# Patient Record
Sex: MALE | Race: BLACK OR AFRICAN AMERICAN | NOT HISPANIC OR LATINO | Employment: FULL TIME | URBAN - METROPOLITAN AREA
[De-identification: names, ages, dates, MRNs, and addresses within clinical notes are randomized per-mention and may not be internally consistent; named-entity substitution may affect disease eponyms.]

---

## 2020-04-08 ENCOUNTER — NON-PROVIDER VISIT (OUTPATIENT)
Dept: URGENT CARE | Facility: PHYSICIAN GROUP | Age: 30
End: 2020-04-08

## 2020-04-08 DIAGNOSIS — Z02.1 PRE-EMPLOYMENT DRUG SCREENING: ICD-10-CM

## 2020-04-08 LAB
AMP AMPHETAMINE: NORMAL
COC COCAINE: NORMAL
INT CON NEG: NEGATIVE
INT CON POS: POSITIVE
MET METHAMPHETAMINES: NORMAL
OPI OPIATES: NORMAL
PCP PHENCYCLIDINE: NORMAL
POC DRUG COMMENT 753798-OCCUPATIONAL HEALTH: NEGATIVE
THC: NORMAL

## 2020-04-08 PROCEDURE — 80305 DRUG TEST PRSMV DIR OPT OBS: CPT | Performed by: PHYSICIAN ASSISTANT

## 2020-12-02 ENCOUNTER — HOSPITAL ENCOUNTER (OUTPATIENT)
Dept: RADIOLOGY | Facility: MEDICAL CENTER | Age: 30
End: 2020-12-02
Attending: FAMILY MEDICINE
Payer: COMMERCIAL

## 2020-12-02 ENCOUNTER — OFFICE VISIT (OUTPATIENT)
Dept: URGENT CARE | Facility: PHYSICIAN GROUP | Age: 30
End: 2020-12-02
Payer: COMMERCIAL

## 2020-12-02 ENCOUNTER — HOSPITAL ENCOUNTER (OUTPATIENT)
Facility: MEDICAL CENTER | Age: 30
End: 2020-12-02
Attending: FAMILY MEDICINE
Payer: COMMERCIAL

## 2020-12-02 VITALS
TEMPERATURE: 99.7 F | BODY MASS INDEX: 33.07 KG/M2 | RESPIRATION RATE: 20 BRPM | OXYGEN SATURATION: 87 % | HEIGHT: 75 IN | WEIGHT: 266 LBS | HEART RATE: 131 BPM | DIASTOLIC BLOOD PRESSURE: 62 MMHG | SYSTOLIC BLOOD PRESSURE: 118 MMHG

## 2020-12-02 DIAGNOSIS — J98.8 RTI (RESPIRATORY TRACT INFECTION): ICD-10-CM

## 2020-12-02 PROCEDURE — U0003 INFECTIOUS AGENT DETECTION BY NUCLEIC ACID (DNA OR RNA); SEVERE ACUTE RESPIRATORY SYNDROME CORONAVIRUS 2 (SARS-COV-2) (CORONAVIRUS DISEASE [COVID-19]), AMPLIFIED PROBE TECHNIQUE, MAKING USE OF HIGH THROUGHPUT TECHNOLOGIES AS DESCRIBED BY CMS-2020-01-R: HCPCS

## 2020-12-02 PROCEDURE — 71046 X-RAY EXAM CHEST 2 VIEWS: CPT

## 2020-12-02 PROCEDURE — 99214 OFFICE O/P EST MOD 30 MIN: CPT | Performed by: FAMILY MEDICINE

## 2020-12-02 RX ORDER — DEXAMETHASONE 6 MG/1
6 TABLET ORAL DAILY
Qty: 4 TAB | Refills: 0 | Status: SHIPPED | OUTPATIENT
Start: 2020-12-03 | End: 2020-12-07

## 2020-12-02 RX ORDER — BENZONATATE 100 MG/1
200 CAPSULE ORAL 3 TIMES DAILY PRN
Qty: 30 CAP | Refills: 1 | Status: SHIPPED | OUTPATIENT
Start: 2020-12-02 | End: 2021-02-09

## 2020-12-02 RX ORDER — ASPIRIN 81 MG/1
162 TABLET, CHEWABLE ORAL ONCE
Status: COMPLETED | OUTPATIENT
Start: 2020-12-02 | End: 2020-12-02

## 2020-12-02 RX ORDER — AZITHROMYCIN 250 MG/1
TABLET, FILM COATED ORAL
Qty: 6 TAB | Refills: 0 | Status: SHIPPED | OUTPATIENT
Start: 2020-12-02 | End: 2021-02-09

## 2020-12-02 RX ORDER — DEXAMETHASONE SODIUM PHOSPHATE 4 MG/ML
8 INJECTION, SOLUTION INTRA-ARTICULAR; INTRALESIONAL; INTRAMUSCULAR; INTRAVENOUS; SOFT TISSUE ONCE
Status: COMPLETED | OUTPATIENT
Start: 2020-12-02 | End: 2020-12-02

## 2020-12-02 RX ADMIN — DEXAMETHASONE SODIUM PHOSPHATE 8 MG: 4 INJECTION, SOLUTION INTRA-ARTICULAR; INTRALESIONAL; INTRAMUSCULAR; INTRAVENOUS; SOFT TISSUE at 17:03

## 2020-12-02 RX ADMIN — ASPIRIN 162 MG: 81 TABLET, CHEWABLE ORAL at 17:02

## 2020-12-02 ASSESSMENT — ENCOUNTER SYMPTOMS
SHORTNESS OF BREATH: 1
COUGH: 1
FEVER: 1

## 2020-12-02 NOTE — PROGRESS NOTES
"Subjective:      Micah Jimenez is a 30 y.o. male who presents with Cough (SOB,(x3 days) cough (14x days) )    - This is a pleasant and nontoxic appearing 30 y.o. male with c/o ~1.5wks ago developed flu like symptoms (cough/sinus/headaches/subjective fevers/aches/malaise). This has mostly all improved except still has cough and now has been feeling more winded when he does activity over past 3 days. Says is not feeling more SOB, has been stable SOB over past 3 days and says can walk around house w/o getting too winded. No chest pain or limb swelling.             ALLERGIES:  Patient has no allergy information on record.     PMH:  History reviewed. No pertinent past medical history.     PSH:  History reviewed. No pertinent surgical history.    MEDS:    Current Outpatient Medications:   •  azithromycin (ZITHROMAX) 250 MG Tab, Use as directed, Disp: 6 Tab, Rfl: 0  •  [START ON 12/3/2020] dexamethasone (DECADRON) 6 MG Tab, Take 1 Tab by mouth every day for 4 days., Disp: 4 Tab, Rfl: 0  •  benzonatate (TESSALON) 100 MG Cap, Take 2 Caps by mouth 3 times a day as needed for Cough., Disp: 30 Cap, Rfl: 1    Current Facility-Administered Medications:   •  dexamethasone (DECADRON) injection 8 mg, 8 mg, Intramuscular, Once, Michael Grant M.D.  •  aspirin (ASA) chewable tab 162 mg, 162 mg, Oral, Once, Michael Grant M.D.    ** I have documented what I find to be significant in regards to past medical, social, family and surgical history  in my HPI or under PMH/PSH/ review section, otherwise it is noncontributory **             HPI    Review of Systems   Constitutional: Positive for fever.   Respiratory: Positive for cough and shortness of breath.    All other systems reviewed and are negative.         Objective:     /62 (BP Location: Left arm, Patient Position: Sitting, BP Cuff Size: Adult)   Pulse (!) 131   Temp 37.6 °C (99.7 °F) (Temporal)   Resp 20   Ht 1.905 m (6' 3\") Comment: per pt  Wt 120.7 kg (266 " lb) Comment: per pt  SpO2 (!) 87%   BMI 33.25 kg/m²      Physical Exam  Vitals signs and nursing note reviewed.   Constitutional:       General: He is not in acute distress.     Appearance: He is well-developed. He is not diaphoretic.   HENT:      Head: Normocephalic and atraumatic.      Mouth/Throat:      Mouth: Mucous membranes are moist.      Pharynx: Oropharynx is clear.   Eyes:      General: No scleral icterus.     Conjunctiva/sclera: Conjunctivae normal.   Cardiovascular:      Heart sounds: Normal heart sounds. No murmur.   Pulmonary:      Effort: Pulmonary effort is normal. No respiratory distress.      Breath sounds: Normal breath sounds.   Skin:     Coloration: Skin is not pale.      Findings: No rash.   Neurological:      Mental Status: He is alert.      Motor: No abnormal muscle tone.   Psychiatric:         Mood and Affect: Mood normal.         Behavior: Behavior normal.         Judgment: Judgment normal.                 Assessment/Plan:            1. RTI (respiratory tract infection)  DX-CHEST-2 VIEWS    COVID/SARS COV-2 PCR    azithromycin (ZITHROMAX) 250 MG Tab    dexamethasone (DECADRON) injection 8 mg    dexamethasone (DECADRON) 6 MG Tab    REFERRAL TO FOLLOW-UP WITH PRIMARY CARE    aspirin (ASA) chewable tab 162 mg    benzonatate (TESSALON) 100 MG Cap     * discussed w/ patient going to ER today for further eval as he does drop below 90% w/ activity and HR goes up to 130. Resting HR and pulse Ox 90 and 92%.     He says he will not go to ER, does not want to go and rather just go home now.     - Dx & d/c instructions discussed w/ patient and/or family members.   - rest/hydrate  - 2 baby asa daily  - close observation  - prone for 1 hr 3-4x/day, blow balloon up 3-4x/hr while awake  - E.R. precautions discussed       Follow up with their PCP in 1-2 days strongly advised, ER if not improving or feeling/getting worse.

## 2020-12-02 NOTE — LETTER
December 2, 2020         Patient: Micah Jimenez   YOB: 1990   Date of Visit: 12/2/2020           To Whom it May Concern:    Micah Jimenez was seen in my clinic on 12/2/2020. He is to be off work for 1 week, returning 12/10/2020.    If you have any questions or concerns, please don't hesitate to call.        Sincerely,           Michael Grant M.D.  Electronically Signed

## 2020-12-03 DIAGNOSIS — J98.8 RTI (RESPIRATORY TRACT INFECTION): ICD-10-CM

## 2020-12-03 LAB — COVID ORDER STATUS COVID19: NORMAL

## 2020-12-04 ENCOUNTER — TELEPHONE (OUTPATIENT)
Dept: URGENT CARE | Facility: PHYSICIAN GROUP | Age: 30
End: 2020-12-04

## 2020-12-04 ENCOUNTER — TELEPHONE (OUTPATIENT)
Dept: SCHEDULING | Facility: IMAGING CENTER | Age: 30
End: 2020-12-04

## 2020-12-04 LAB
SARS-COV-2 RNA RESP QL NAA+PROBE: NOTDETECTED
SPECIMEN SOURCE: NORMAL

## 2020-12-04 NOTE — TELEPHONE ENCOUNTER
Kindly call (DOCUMENT CALL OR ATTEMPTED CALL IN EPIC) and let patient know:     His coronavirus test did not show coronavirus but this is probably a false negative b/c his chest x-ray had changes of coronavirus pneumonia.     If he is not feeling improved for the other day he was here or is feeling worse or more SOB he should go to the ER

## 2021-02-09 ENCOUNTER — OFFICE VISIT (OUTPATIENT)
Dept: MEDICAL GROUP | Facility: PHYSICIAN GROUP | Age: 31
End: 2021-02-09
Payer: COMMERCIAL

## 2021-02-09 VITALS
DIASTOLIC BLOOD PRESSURE: 78 MMHG | WEIGHT: 282.2 LBS | SYSTOLIC BLOOD PRESSURE: 126 MMHG | BODY MASS INDEX: 35.09 KG/M2 | TEMPERATURE: 98.2 F | OXYGEN SATURATION: 96 % | HEART RATE: 90 BPM | HEIGHT: 75 IN

## 2021-02-09 DIAGNOSIS — Z13.0 SCREENING FOR ENDOCRINE, METABOLIC AND IMMUNITY DISORDER: ICD-10-CM

## 2021-02-09 DIAGNOSIS — Z13.29 SCREENING FOR ENDOCRINE, METABOLIC AND IMMUNITY DISORDER: ICD-10-CM

## 2021-02-09 DIAGNOSIS — R06.83 SNORING: ICD-10-CM

## 2021-02-09 DIAGNOSIS — Z13.228 SCREENING FOR ENDOCRINE, METABOLIC AND IMMUNITY DISORDER: ICD-10-CM

## 2021-02-09 PROCEDURE — 99214 OFFICE O/P EST MOD 30 MIN: CPT | Performed by: PHYSICIAN ASSISTANT

## 2021-02-09 SDOH — HEALTH STABILITY: MENTAL HEALTH: HOW OFTEN DO YOU HAVE A DRINK CONTAINING ALCOHOL?: MONTHLY OR LESS

## 2021-02-09 ASSESSMENT — PATIENT HEALTH QUESTIONNAIRE - PHQ9: CLINICAL INTERPRETATION OF PHQ2 SCORE: 0

## 2021-02-09 NOTE — PATIENT INSTRUCTIONS
SLEEP STUDY INSTRUCTIONS    1. Our main concern is to provide the best test and evaluation of your sleep and your cooperation in following the guidelines is very necessary.    2. We have no facilities for family members or guests at the sleep center. Special arrangements will be made for children requiring overnight sleep studies.    3. Unless otherwise instructed, AVOID alcoholic beverages on the day of your sleep study.    4. DO NOT drink coffee or caffeine-containing beverages after 12:00 noon on the day of your sleep study.    5. There is NO smoking at the sleep center.    6. Try to maintain a usual daytime schedule prior to the study (avoid unusual physical activity or meals).    7. DO NOT take a nap on the day of your study.    8. This is an outpatient procedure (test); therefore, nursing services, medications, and meals ARE NOT provided. If you take medications, bring them with you and take them on the schedule you do at home.    9. Please fill your sleep aid prescription (Ambien or Lunesta) and bring to your sleep study. Even patients who normally have no problem going to sleep often need a sleep aid in this different environment.    10. We ask that you wear conventional sleep attire (pajamas or sweats) for the sleep study. We discourage patients from wearing only their underwear to bed. We recommend two-piece pajamas as the techs will need to apply sensors to your stomach.    11. Please shampoo your hair the day of the sleep study. Please DO NOT use any other hair or skin products before your arrival (e.g., mousse, gel, hair or body spray, perfume, body lotion etc.) NOTE: Women should not wear heavy makeup prior to arrival as some wires are taped to the face area.    12. The technician will be applying several small electrodes to the scalp, eye area, chin, chest, and legs, plus respiratory effort belts around the chest. Also, there will be a device placed directly under the nose. (THIS WILL NOT OBSTRUCT  YOUR BREATHING.) This is a painless procedure and the skin is not broken.    13. The test is generally completed in six to eight hours; We are usually done between 6 - 7 a.m., unless you are scheduled for a nap study. You may need to come back another night for a second study to complete your treatment plan.    14. Patients who are scheduled for an MSLT (nap study) will stay at the sleep center for the day following their nighttime study. You will be notified if a nap study was ordered for you at the time the night study is scheduled. Generally, patients having a nap study will leave the sleep center by 4 p.m.    15. You will need to bring food for the following day if you are scheduled for a nap study. A refrigerator and microwave are available.    16. A bathroom is available for your use.    17. We are able to adjust the room temperature for your comfort. Please let the technologist know if you are uncomfortable during the study.    18. If you sleep better with a special pillow or stuffed animal, you may bring it along. Service animals are the only live animals permitted.    19. Cable T.V. is available.    20. You will be scheduled for a follow-up appointment three to five days after the sleep study to review your results.    21. A copy of your sleep study is sent to the referring physician approximately two weeks after your study.    22. Any questions can be directed to our staff at [phone].    23. If CPAP therapy is applied, a home unit will be ordered for you through the [employer] medical equipment company. You will be contacted to schedule delivery after insurance authorization.

## 2021-02-09 NOTE — PROGRESS NOTES
"Subjective:     CC: Establish care, obesity, snoring    HPI:   Micah presents today with     BMI 35.0-35.9,adult  Feels like his weight is up, eating fast food 4 times per week.  Patient reports that him and his fiancée will have a very busy schedule and that it can be difficult making dinner.  He is also not going to the gym as often due to COVID-19 and having capacity limits at the gym.     Snoring  Patient reports that he snores during the night and also feels very tired during the day sometimes.  His fiancée has never told him that he stops breathing when he sleeps      History reviewed. No pertinent past medical history.    Social History     Tobacco Use   • Smoking status: Never Smoker   • Smokeless tobacco: Never Used   Substance Use Topics   • Alcohol use: Yes     Frequency: Monthly or less     Comment: once every 3-4 months   • Drug use: Not on file       No current [facility]-ordered outpatient medications on file.     No current [facility]-ordered facility-administered medications on file.        Allergies:  Patient has no allergy information on record.    Health Maintenance: Completed.  Patient is going to schedule an MA visit for his tetanus shot after talking to his insurance to make sure that it is covered    ROS:  Gen: no fevers/chills  Eyes: no changes in vision  ENT: no sore throat  Pulm: no sob, no cough  CV: no chest pain  GI: no nausea/vomiting  : no dysuria  MSk: no myalgias  Skin: no rash  Neuro: no headaches  Psych: no depression, no anxiety      Objective:       Exam:  /78   Pulse 90   Temp 36.8 °C (98.2 °F) (Temporal)   Ht 1.905 m (6' 3\")   Wt (!) 128 kg (282 lb 3.2 oz)   SpO2 96%   BMI 35.27 kg/m²  Body mass index is 35.27 kg/m².    Gen: Alert and oriented, No apparent distress.  Skin: Warm, dry, good turgor, no rashes in visible areas.  Eye: Equal, round and reactive, conjunctiva clear, lids normal.  Moist mucous membranes, tonsils are 2+.  Normal posterior pharynx  Neck: Trachea " midline, no masses, no thyromegaly  Lungs: Normal effort, CTA bilaterally, no wheezes, rhonchi, or rales  CV: Regular rate and rhythm. No murmurs, rubs, or gallops.  MSK: Normal gait, moves all extremities.  Neuro: Grossly non-focal.  Ext: No clubbing, cyanosis, edema.  Psych: Alert and oriented x3, normal affect and mood.     Labs: Labs from 12/3/2020 were reviewed.     Assessment & Plan:     31 y.o. male with the following -     1. BMI 35.0-35.9,adult  Patient was counseled extensively about healthy diet and exercise.  He was encouraged to cut back on the amount of fast food he is eating, and if he is going to eat out to make healthier choices which she has done in the past and been successful with.  He is also going to start drinking protein shakes for breakfast which he has previously been successful with weight loss.  We talked at a referral to the health improvement program, however the patient would like to work on his diet and exercise at home prior to having referral.     2. Snoring  Patient has a STOP-BANG score of 5.  I placed a referral for a sleep study to rule out KEDAR.   - Polysomnography, 4 or More; Future  - REFERRAL TO PULMONARY AND SLEEP MEDICINE    3. Screening for endocrine, metabolic and immunity disorder  Annual labs ordered today.   - Lipid Profile; Future  - CBC WITH DIFFERENTIAL; Future  - Comp Metabolic Panel; Future  - HEMOGLOBIN A1C; Future    I spent a total of 36 minutes with record review (including external notes and labs), exam, communication with the patient, communication with other providers, and documentation of this encounter.     Return in about 1 year (around 2/9/2022), or if symptoms worsen or fail to improve, for annual wellness.    Please note that this dictation was created using voice recognition software. I have made every reasonable attempt to correct obvious errors, but I expect that there are errors of grammar and possibly content that I did not discover before  finalizing the note.

## 2021-02-09 NOTE — ASSESSMENT & PLAN NOTE
Patient reports that he snores during the night and also feels very tired during the day sometimes.  His fiancée has never told him that he stops breathing when he sleeps

## 2021-02-09 NOTE — ASSESSMENT & PLAN NOTE
Feels like his weight is up, eating fast food 4 times per week.  Patient reports that him and his fiancée will have a very busy schedule and that it can be difficult making dinner.  He is also not going to the gym as often due to COVID-19 and having capacity limits at the gym.

## 2021-02-11 ENCOUNTER — TELEPHONE (OUTPATIENT)
Dept: SLEEP MEDICINE | Facility: MEDICAL CENTER | Age: 31
End: 2021-02-11

## 2021-02-11 DIAGNOSIS — G47.33 OSA (OBSTRUCTIVE SLEEP APNEA): ICD-10-CM

## 2021-02-19 ENCOUNTER — TELEPHONE (OUTPATIENT)
Dept: MEDICAL GROUP | Facility: PHYSICIAN GROUP | Age: 31
End: 2021-02-19

## 2021-02-19 DIAGNOSIS — Z23 NEED FOR VACCINATION: ICD-10-CM

## 2021-02-19 NOTE — TELEPHONE ENCOUNTER
Patient is on the MA Schedule 02/22/2021 for tdap vaccine/injection.    SPECIFIC Action To Be Taken: Orders pending, please sign.

## 2021-11-01 ENCOUNTER — OFFICE VISIT (OUTPATIENT)
Dept: MEDICAL GROUP | Facility: PHYSICIAN GROUP | Age: 31
End: 2021-11-01

## 2021-11-01 VITALS
HEIGHT: 75 IN | RESPIRATION RATE: 16 BRPM | BODY MASS INDEX: 37.55 KG/M2 | OXYGEN SATURATION: 98 % | TEMPERATURE: 98.2 F | DIASTOLIC BLOOD PRESSURE: 82 MMHG | WEIGHT: 302 LBS | HEART RATE: 96 BPM | SYSTOLIC BLOOD PRESSURE: 130 MMHG

## 2021-11-01 DIAGNOSIS — Z02.89 ENCOUNTER FOR COMPLETION OF FORM WITH PATIENT: ICD-10-CM

## 2021-11-01 PROCEDURE — 99213 OFFICE O/P EST LOW 20 MIN: CPT | Performed by: PHYSICIAN ASSISTANT

## 2021-11-01 NOTE — LETTER
November 1, 2021    To Whom It May Concern:         This is confirmation that Micah KATHERYN Jimenez attended his scheduled appointment with Katherin Rodriguez P.A.-C. on 11/01/21. He was evaluated and is cleared to return to work without any restrictions.          If you have any questions please do not hesitate to call me at the phone number listed below.    Sincerely,          Katherin Rodriguez P.A.-C.  [phone]

## 2021-11-01 NOTE — PROGRESS NOTES
"Subjective:     CC: Encounter for completion of form with patient    HPI:   Micah presents today for return to work letter.  He recently was walking into work and someone called him from behind and he turned around and bumped his knee at work on a metal pole. Happened 1.5 weeks ago and hit left knee. He was not clocked in so he was told by HR that he had to go home. Denies any knee pain at this time, only hurt for an hour and felt like when you hit your funny bone. They will not let him return to work without a note.  This is not a part of a Worker's Compensation claim.     History reviewed. No pertinent past medical history.    Social History     Tobacco Use   • Smoking status: Never Smoker   • Smokeless tobacco: Never Used   Vaping Use   • Vaping Use: Never used   Substance Use Topics   • Alcohol use: Yes     Comment: once every 3-4 months   • Drug use: Never       No current [facility]-ordered outpatient medications on file.     No current [facility]-ordered facility-administered medications on file.       Allergies:  Patient has no known allergies.    Health Maintenance: Completed    ROS:  Gen: no fevers/chills  Eyes: no changes in vision  ENT: no sore throat  Pulm: no sob, no cough  CV: no chest pain  GI: no nausea/vomiting  : no dysuria  MSk: negative for knee pain  Skin: no rash  Neuro: no headaches    Objective:     Exam:  /82   Pulse 96   Temp 36.8 °C (98.2 °F) (Temporal)   Resp 16   Ht 1.905 m (6' 3\")   Wt (!) 137 kg (302 lb)   SpO2 98%   BMI 37.75 kg/m²  Body mass index is 37.75 kg/m².    Gen: Alert and oriented, No apparent distress.  Skin: Warm, dry, good turgor, no rashes in visible areas.  HEENT: Normocephalic. Eyes conjunctiva clear lids without ptosis, pupils equal and reactive to light accommodation, ears normal shape and contour  Neck: Trachea midline, no masses, no thyromegaly  Lungs: Normal effort  MSK: Normal gait, moves all extremities. Knee: Full ROM, full strength, TTP none, edema none, " varus/valgus stress normal, anterior/posterior drawer normal, Lachman's normal  Neuro: Grossly non-focal.  Ext: No clubbing, cyanosis, edema.  Psych: Alert and oriented x3, normal affect and mood.     Assessment & Plan:     31 y.o. male with the following -     1. Encounter for completion of form with patient  Patient is here today for a letter to return to work.  He states that a week and a half ago he turned and hit his knee on a metal pole when he was arriving at work.  Pain lasted for an hour and has been present since then.  His knee exam is completely normal today with full range of motion and no tenderness.  He has been off of work for a week and a half because he is not been able to find a provider to write the note through urgent care and he does not have any insurance.  Given that his exam is benign today and is not a part of a Worker's Compensation claim, I have written a note for him to return to work without any restrictions as he does not have an underlying knee injury.    I spent a total of 20 minutes with record review (including external notes and labs), exam, communication with the patient, communication with other providers, and documentation of this encounter.     Return for annual wellness or sooner if needed.    Please note that this dictation was created using voice recognition software. I have made every reasonable attempt to correct obvious errors, but I expect that there are errors of grammar and possibly content that I did not discover before finalizing the note.    Electronically signed by Katherin Rodriguez PA-C on November 1, 2021